# Patient Record
Sex: MALE | Race: BLACK OR AFRICAN AMERICAN
[De-identification: names, ages, dates, MRNs, and addresses within clinical notes are randomized per-mention and may not be internally consistent; named-entity substitution may affect disease eponyms.]

---

## 2017-07-18 ENCOUNTER — HOSPITAL ENCOUNTER (EMERGENCY)
Dept: HOSPITAL 72 - EMR | Age: 47
Discharge: HOME | End: 2017-07-18
Payer: COMMERCIAL

## 2017-07-18 VITALS — DIASTOLIC BLOOD PRESSURE: 105 MMHG | SYSTOLIC BLOOD PRESSURE: 201 MMHG

## 2017-07-18 VITALS — WEIGHT: 200 LBS | HEIGHT: 67 IN | BODY MASS INDEX: 31.39 KG/M2

## 2017-07-18 VITALS — SYSTOLIC BLOOD PRESSURE: 183 MMHG | DIASTOLIC BLOOD PRESSURE: 95 MMHG

## 2017-07-18 VITALS — DIASTOLIC BLOOD PRESSURE: 90 MMHG | SYSTOLIC BLOOD PRESSURE: 167 MMHG

## 2017-07-18 DIAGNOSIS — I10: ICD-10-CM

## 2017-07-18 DIAGNOSIS — S61.011A: Primary | ICD-10-CM
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
## 2017-07-18 DIAGNOSIS — E11.9: ICD-10-CM

## 2017-07-18 DIAGNOSIS — Z23: ICD-10-CM
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
## 2017-07-18 PROCEDURE — 90471 IMMUNIZATION ADMIN: CPT

## 2017-07-18 PROCEDURE — 96372 THER/PROPH/DIAG INJ SC/IM: CPT

## 2017-07-18 PROCEDURE — 90715 TDAP VACCINE 7 YRS/> IM: CPT

## 2017-07-18 NOTE — EMERGENCY ROOM REPORT
History of Present Illness


General


Chief Complaint:  Laceration


Source:  Patient





Present Illness


HPI


Patient states that he lacerated his thumb on a sharp edge on his truck.  He 

has no other injuries or complaints.


Allergies:  


Coded Allergies:  


     No Known Allergies (Unverified , 2/2/14)





Patient History


Past Medical History:  see triage record, DM, HTN


Social History:  Denies: alcohol use, drug use, smoking


Reviewed Nursing Documentation:  PMH: Agreed, PSxH: Agreed





Nursing Documentation-PMH


Past Medical History:  No History, Except For


Hx Cardiac Problems:  Yes


Hx Hypertension:  Yes


Hx Diabetes:  Yes





Review of Systems


All Other Systems:  negative except mentioned in HPI





Physical Exam





Vital Signs








  Date Time  Temp Pulse Resp B/P Pulse Ox O2 Delivery O2 Flow Rate FiO2


 


7/18/17 10:28 99.0 77 14 168/87 99 Room Air  








Sp02 EP Interpretation:  reviewed, normal


General Appearance:  no apparent distress, alert, GCS 15, non-toxic


Head:  normocephalic, atraumatic


Eyes:  bilateral eye PERRL, bilateral eye normal inspection


ENT:  hearing grossly normal, normal pharynx, no angioedema, normal voice


Respiratory:  no respiratory distress, no retraction, no accessory muscle use, 

speaking full sentences


Rectal:  deferred


Musculoskeletal:  back normal, gait/station normal, normal range of motion, 

other - R. Thumb:  2cm superficial laceration


Neurologic:  alert, oriented x3, responsive, motor strength/tone normal, 

sensory intact, speech normal


Psychiatric:  judgement/insight normal, memory normal, mood/affect normal, no 

suicidal/homicidal ideation


Skin:  normal color, no rash, warm/dry, well hydrated, other - See MSK exam





Procedures


Laceration/Wound Repair


Laceration/Wound Repair :  


   Consent:  Verbal


   Wound Location:  other - R. thumb


   Wound's Depth, Shape:  superficial


   Wound Length (cm):  2


   Wound Explored:  clean


   Irrigated w/ Saline (ccs):  2000


   Anesthesia:  1% Lidocaine


   Volume Anesthetic (ccs):  5


   Wound Debrided:  minimal


   Wound Repaired With:  sutures


   Suture Size/Type:  5:0, nylon


   Number of Sutures:  7


   Layer Closure?:  No


   Sterile Dressing Applied?:  Yes


   Splint Applied?:  No


   Sling Applied?:  No


   Patient Tolerated:  Well


   Complications:  None





Medical Decision Making


Diagnostic Impression:  


 Primary Impression:  


 Laceration


ER Course


This patient is a superficial laceration of the right thumb.  Tetanus was 

updated.  Wound was closed without competition or incident.  See my procedure 

note.  The patient will be placed on course of oral antibiotics given the 

location of the laceration.  The patient is given wound care instructions and 

followup instructions.





Last Vital Signs








  Date Time  Temp Pulse Resp B/P Pulse Ox O2 Delivery O2 Flow Rate FiO2


 


7/18/17 12:34 98.2 76 16 183/95 97 Room Air  








Status:  improved


Disposition:  HOME, SELF-CARE


Condition:  Improved


Referrals:  


NOT CHOSEN IPA/MD,REFERRING (PCP)


Patient Instructions:  Laceration Care, Adult











SUJATHA IZAGUIRRE D.O. Jul 18, 2017 12:51

## 2017-08-09 ENCOUNTER — HOSPITAL ENCOUNTER (OUTPATIENT)
Dept: HOSPITAL 72 - RAD | Age: 47
Discharge: HOME | End: 2017-08-09
Payer: COMMERCIAL

## 2017-08-09 DIAGNOSIS — M54.5: Primary | ICD-10-CM

## 2017-08-09 DIAGNOSIS — M25.551: ICD-10-CM

## 2017-08-09 PROCEDURE — 72110 X-RAY EXAM L-2 SPINE 4/>VWS: CPT

## 2020-07-09 ENCOUNTER — HOSPITAL ENCOUNTER (EMERGENCY)
Dept: HOSPITAL 72 - EMR | Age: 50
Discharge: HOME | End: 2020-07-09
Payer: COMMERCIAL

## 2020-07-09 VITALS — SYSTOLIC BLOOD PRESSURE: 159 MMHG | DIASTOLIC BLOOD PRESSURE: 87 MMHG

## 2020-07-09 VITALS — BODY MASS INDEX: 31.08 KG/M2 | HEIGHT: 67 IN | WEIGHT: 198 LBS

## 2020-07-09 VITALS — DIASTOLIC BLOOD PRESSURE: 87 MMHG | SYSTOLIC BLOOD PRESSURE: 159 MMHG

## 2020-07-09 DIAGNOSIS — I51.9: ICD-10-CM

## 2020-07-09 DIAGNOSIS — E11.9: ICD-10-CM
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
## 2020-07-09 DIAGNOSIS — H20.9: Primary | ICD-10-CM
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
## 2020-07-09 DIAGNOSIS — I10: ICD-10-CM
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
## 2020-07-09 PROCEDURE — 99283 EMERGENCY DEPT VISIT LOW MDM: CPT
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
